# Patient Record
Sex: FEMALE | Race: WHITE | NOT HISPANIC OR LATINO | URBAN - METROPOLITAN AREA
[De-identification: names, ages, dates, MRNs, and addresses within clinical notes are randomized per-mention and may not be internally consistent; named-entity substitution may affect disease eponyms.]

---

## 2024-08-30 ENCOUNTER — APPOINTMENT (RX ONLY)
Age: 64
Setting detail: DERMATOLOGY
End: 2024-08-30

## 2024-08-30 DIAGNOSIS — L82.1 OTHER SEBORRHEIC KERATOSIS: ICD-10-CM

## 2024-08-30 DIAGNOSIS — D22 MELANOCYTIC NEVI: ICD-10-CM

## 2024-08-30 DIAGNOSIS — L57.0 ACTINIC KERATOSIS: ICD-10-CM

## 2024-08-30 DIAGNOSIS — L81.4 OTHER MELANIN HYPERPIGMENTATION: ICD-10-CM

## 2024-08-30 DIAGNOSIS — D18.0 HEMANGIOMA: ICD-10-CM

## 2024-08-30 PROBLEM — D22.62 MELANOCYTIC NEVI OF LEFT UPPER LIMB, INCLUDING SHOULDER: Status: ACTIVE | Noted: 2024-08-30

## 2024-08-30 PROBLEM — D18.01 HEMANGIOMA OF SKIN AND SUBCUTANEOUS TISSUE: Status: ACTIVE | Noted: 2024-08-30

## 2024-08-30 PROCEDURE — ? LIQUID NITROGEN

## 2024-08-30 PROCEDURE — 17000 DESTRUCT PREMALG LESION: CPT

## 2024-08-30 PROCEDURE — 99213 OFFICE O/P EST LOW 20 MIN: CPT | Mod: 25

## 2024-08-30 PROCEDURE — ? COUNSELING

## 2024-08-30 ASSESSMENT — LOCATION DETAILED DESCRIPTION DERM
LOCATION DETAILED: RIGHT DISTAL PRETIBIAL REGION
LOCATION DETAILED: LEFT ANTERIOR SHOULDER
LOCATION DETAILED: RIGHT PROXIMAL PRETIBIAL REGION
LOCATION DETAILED: LEFT ANTERIOR PROXIMAL UPPER ARM
LOCATION DETAILED: RIGHT VENTRAL PROXIMAL FOREARM

## 2024-08-30 ASSESSMENT — LOCATION SIMPLE DESCRIPTION DERM
LOCATION SIMPLE: LEFT SHOULDER
LOCATION SIMPLE: RIGHT PRETIBIAL REGION
LOCATION SIMPLE: LEFT UPPER ARM
LOCATION SIMPLE: RIGHT FOREARM

## 2024-08-30 ASSESSMENT — LOCATION ZONE DERM
LOCATION ZONE: LEG
LOCATION ZONE: ARM

## 2024-08-30 NOTE — PROCEDURE: MIPS QUALITY
Detail Level: Detailed
Name And Contact Information For Health Care Proxy: Nick koroma [phone]
Quality 431: Preventive Care And Screening: Unhealthy Alcohol Use - Screening: Patient not identified as an unhealthy alcohol user when screened for unhealthy alcohol use using a systematic screening method
Quality 226: Preventive Care And Screening: Tobacco Use: Screening And Cessation Intervention: Patient screened for tobacco use and is an ex/non-smoker
Quality 47: Advance Care Plan: Advance Care Planning discussed and documented; advance care plan or surrogate decision maker documented in the medical record.

## 2025-05-19 ENCOUNTER — OFFICE VISIT (OUTPATIENT)
Dept: URBAN - METROPOLITAN AREA CLINIC 19 | Facility: CLINIC | Age: 65
End: 2025-05-19
Payer: COMMERCIAL

## 2025-05-19 ENCOUNTER — DASHBOARD ENCOUNTERS (OUTPATIENT)
Age: 65
End: 2025-05-19

## 2025-05-19 ENCOUNTER — LAB OUTSIDE AN ENCOUNTER (OUTPATIENT)
Dept: URBAN - METROPOLITAN AREA CLINIC 19 | Facility: CLINIC | Age: 65
End: 2025-05-19

## 2025-05-19 DIAGNOSIS — R79.89 ELEVATED LFTS: ICD-10-CM

## 2025-05-19 DIAGNOSIS — Z86.0100 PERSONAL HISTORY OF COLONIC POLYPS: ICD-10-CM

## 2025-05-19 PROCEDURE — 99214 OFFICE O/P EST MOD 30 MIN: CPT | Performed by: INTERNAL MEDICINE

## 2025-05-19 RX ORDER — PAROXETINE 7.5 MG/1
TAKE 1 CAPSULE (7.5 MG) BY ORAL ROUTE ONCE DAILY AT BEDTIME CAPSULE ORAL 1
Qty: 0 | Refills: 0 | Status: DISCONTINUED | COMMUNITY
Start: 1900-01-01

## 2025-05-19 RX ORDER — ASPIRIN 81 MG/1
TABLET, COATED ORAL
Qty: 0 | Refills: 0 | Status: DISCONTINUED | COMMUNITY
Start: 1900-01-01

## 2025-05-19 RX ORDER — CARVEDILOL 12.5 MG/1
TABLET, FILM COATED ORAL
Qty: 0 | Refills: 0 | Status: ACTIVE | COMMUNITY
Start: 1900-01-01

## 2025-05-19 NOTE — PHYSICAL EXAM RECTAL:
normal tone, no external hemorrhoids, no masses palpable, no red blood, Tenderness on LAST, Internal hemorrhoids present

## 2025-05-19 NOTE — HPI-TODAY'S VISIT:
Mrs. Hays is a 64 year old female who presents to GI clinic as she moved. On   On 12/11/2015 colonoscopy showed a 5 mm flat transverse colon polyp and sigmoid and descending colon diverticulosis. Repeat colonoscopy was recommended in 3 years.   She reports having a colonoscopy 3 years ago during which polyps were removed and repeat colonoscopy was recommended in 1 year.   On 1/3/2017 EGD showed mild gastritis. Pathology of GE junction was negative for Macedo's esophagus and gastric biopsies were negative for H. pylori.   On 3/12/2025 labs showed Hgb 13, Plt 265, AST 53, ALT 70, Bili 0.59, alk phos 112, and albumin 4.9.   On 3/20/2025 RUQ US showed mild hepatic steatsosis and hepatomegaly.   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    She reports having up to 4 drinks in a week.

## 2025-05-24 LAB
A/G RATIO: 1.8
ACTIN (SMOOTH MUSCLE) ANTIBODY (IGG): 63
ALBUMIN: 4.6
ALKALINE PHOSPHATASE: 132
ALPHA-1-ANTITRYPSIN (AAT) PHENOTYPE: (no result)
ALT (SGPT): 66
ANA SCREEN, IFA: POSITIVE
AST (SGOT): 51
BILIRUBIN, TOTAL: 0.7
BUN/CREATININE RATIO: (no result)
BUN: 18
CALCIUM: 9.8
CARBON DIOXIDE, TOTAL: 25
CHLORIDE: 106
CREATININE: 0.65
EGFR: 98
FERRITIN, SERUM: 122
GLOBULIN, TOTAL: 2.5
GLUCOSE: 86
HEPATITIS A AB, TOTAL: (no result)
HEPATITIS B CORE AB TOTAL: (no result)
HEPATITIS B SURFACE AB IMMUNITY, QN: 192
HEPATITIS B SURFACE ANTIGEN: (no result)
HEPATITIS C ANTIBODY: (no result)
INR: 1
INTERPRETATION: (no result)
IRON BIND.CAP.(TIBC): 358
IRON SATURATION: 32
IRON: 116
MITOCHONDRIAL (M2) ANTIBODY: <=20
POTASSIUM: 4.4
PROTEIN, TOTAL: 7.1
PT: 10.1
RHEUMATOID FACTOR: <10
SJOGREN'S ANTIBODY (SS-A): (no result)
SJOGREN'S ANTIBODY (SS-B): (no result)
SODIUM: 140

## 2025-05-28 ENCOUNTER — TELEPHONE ENCOUNTER (OUTPATIENT)
Dept: URBAN - METROPOLITAN AREA CLINIC 19 | Facility: CLINIC | Age: 65
End: 2025-05-28